# Patient Record
Sex: MALE | Race: WHITE | Employment: PART TIME | URBAN - METROPOLITAN AREA
[De-identification: names, ages, dates, MRNs, and addresses within clinical notes are randomized per-mention and may not be internally consistent; named-entity substitution may affect disease eponyms.]

---

## 2019-07-15 ENCOUNTER — OFFICE VISIT (OUTPATIENT)
Dept: URGENT CARE | Facility: RETAIL CLINIC | Age: 29
End: 2019-07-15

## 2019-07-15 VITALS
OXYGEN SATURATION: 96 % | DIASTOLIC BLOOD PRESSURE: 64 MMHG | TEMPERATURE: 98.4 F | SYSTOLIC BLOOD PRESSURE: 106 MMHG | HEART RATE: 79 BPM

## 2019-07-15 DIAGNOSIS — L08.9 LOCAL INFECTION OF SKIN AND SUBCUTANEOUS TISSUE: Primary | ICD-10-CM
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
## 2019-07-15 PROCEDURE — 99203 OFFICE O/P NEW LOW 30 MIN: CPT | Performed by: NURSE PRACTITIONER

## 2019-07-15 RX ORDER — DOXYCYCLINE 100 MG/1
100 TABLET ORAL 2 TIMES DAILY
Qty: 20 TABLET | Refills: 0 | Status: SHIPPED | OUTPATIENT
Start: 2019-07-15

## 2019-07-15 ASSESSMENT — ENCOUNTER SYMPTOMS
SINUS PRESSURE: 0
FEVER: 0
VOMITING: 0
CHEST TIGHTNESS: 0
SORE THROAT: 0
WHEEZING: 0
CHILLS: 0
RHINORRHEA: 0
NAUSEA: 0
DIARRHEA: 0
COUGH: 0

## 2019-07-15 NOTE — PATIENT INSTRUCTIONS
"  Patient Education     Tick Bites  Ticks are small arachnids that feed on the blood of rodents, rabbits, birds, deer, dogs, and people. A tick bite may cause a reaction like a spider bite. You may have redness, itching, and slight swelling at the site. Sometimes you may have no reaction where the tick bit you.  Ticks may gorge themselves for days before you find and remove them. The bites themselves aren't cause for concern. But ticks can carry and pass on illnesses such as Lyme disease and [place] spotted fever. Both diseases begin with a rash and symptoms similar to the flu. In advanced stages, these diseases can be quite serious.     A \"bull's eye\" rash is a common symptom of Lyme disease.   When to go to the emergency room (ER)  Not all ticks carry disease. And a tick must stay attached for at least 24 hours to infect you. If you find a tick, don't panic. Try to carefully remove it with tweezers. Grasp the tick near its head and pull without twisting. If you can't easily dislodge the tick or if you leave the head in your skin, get medical care right away.  What to expect in the ER    The tick or any parts of the tick will be removed and the bite will be cleaned.    To prevent disease, you may be given antibiotics. Both Lyme disease and [place] spotted fever respond quickly to these medicines.    You may be asked to see your healthcare provider for a blood test to check for Lyme disease.  Follow-up care  Some states and counties have services that test ticks for Lyme disease and other diseases. Check with your local officials to see if this service is available in your area.  If you remove a tick yourself, watch for signs of a tick-borne illness. Symptoms may show up within a few days or weeks after a bite. Call your healthcare provider if you notice any of the following:    Rash. The rash may spread outward in a ring from a hard white lump. Or it may move up your arms and legs to your " chest.    Chills and fever    Body aches and joint pain    Severe headache  Date Last Reviewed: 12/1/2016 2000-2018 [employer]. [address] [place] [address], [place] [address]. All rights reserved. This information is not intended as a substitute for professional medical care. Always follow your healthcare professional's instructions.           Patient Education     Wound Check, Infection  You have a wound that has become infected. The wound will not heal properly unless the infection is cleared. Infection in a wound may also spread if it is not treated. In most cases, antibiotic medicines are prescribed to treat a wound infection.   Symptoms of a wound infection include:    Redness or swelling around the wound    Warmth coming from the wound    New or worsening pain    Red streaks around the wound    Draining pus    Fever  Home care  Follow all directions you are given to treat the infection.  Medicines  Take all medicines as prescribed.     If you were given antibiotics, take them until they are gone or your healthcare provider tells you to stop. It is vital to finish the antibiotics even if you feel better. If you don't finish them, the infection may come back and be harder to treat.    If your infection is not responding to the medicines you are taking, you may be prescribed new medicines.    Take medicine for pain as directed by your healthcare provider.  Wound care  Care for your wound as directed by your healthcare provider.    Apply a warm compress (clean cloth soaked in hot water) to the infected area for about 5 to 10 minutes at a time. Be very careful not to burn yourself. Test the cloth on a non-infected area to make sure it is not too hot.    Continue to change the dressing daily. If it becomes wet, stained with wound fluid, or dirty, change it sooner. To change it:  ? Wash your hands with soap and water before changing the dressing.  ? Carefully remove the dressing and tape. If it sticks to  the wound, you may need to wet it a little to remove it. (Don't do this if your healthcare provider has told you not to.)  ? Gently clean the wound with clean water (or saline) using gauze, a clean washcloth, or cotton swab.  ? Don't use soap, alcohol, peroxide or other cleansers.  ? If you were told to dry the wound before putting on a new dressing, gently pat. Don't rub.  ? Throw out the old dressing.  ? Wash your hands again before opening the new, clean dressing.  ? Wash your hands again when you are done.  Follow-up care  Follow up with your healthcare provider as advised. If a culture was done, you will be notified if your treatment needs to change. Call as directed for the results.  When to seek medical advice  Call your healthcare provider right away if any of these occur:    Symptoms of infection don't start to improve within 2 days of starting antibiotics    Symptoms of infection get worse    New symptoms, such as red streaks around the wound    Fever of 100.4 F (38.0 C) or higher for more than 2 days after starting the antibiotics, or as advised by your healthcare provider  Date Last Reviewed: 3/1/2018    9448-0304 [employer]. [address] [place] [address], [place] [address]. All rights reserved. This information is not intended as a substitute for professional medical care. Always follow your healthcare professional's instructions.  If increase redness, swelling drainage or pain to see primary care clinic in 24 to 48 hours

## 2019-07-15 NOTE — PROGRESS NOTES
SUBJECTIVE:   Jose Vizcarra is a 29 year old male presenting with a chief complaint of   Chief Complaint   Patient presents with     Insect Bites     deer tick bite to right pinky toe      Patient lives in [place] and [place] the woods.  Patient is here to visit parents and has residency    He is a new patient of [facility].    Patient is flying to [place] in 2 days and will be there for 3 months.  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  Severity moderately severe  Current and Associated symptoms: itching, painful and red   Location of the rash: right 5th toe   Previous history of a similar rash? None  Recent exposure history tick   Denies exposure to: Denied  Associated symptoms include: nothing.  Treatment measures tried include: none  Patient states he did not injury this 5th digit     Review of Systems   Constitutional: Negative for chills and fever.   HENT: Negative for rhinorrhea, sinus pressure and sore throat.    Respiratory: Negative for cough, chest tightness and wheezing.    Cardiovascular: Negative for chest pain.   Gastrointestinal: Negative for diarrhea, nausea and vomiting.       Past Medical History:   Diagnosis Date     Mumps 2014     Whooping cough 2014     No family history on file.  Current Outpatient Medications   Medication Sig Dispense Refill     acetaminophen (TYLENOL) 500 MG tablet Take 2 tablets (1,000 mg) by mouth every 6 hours as needed for pain (Patient not taking: Reported on 7/15/2019)       HYDROcodone-acetaminophen (NORCO) 5-325 MG per tablet Take 1-2 tablets by mouth every 4 hours as needed for moderate to severe pain 20 tablet 0     Social History     Tobacco Use     Smoking status: Light Tobacco Smoker     Types: Pipe     Smokeless tobacco: Never Used   Substance Use Topics     Alcohol use: Yes       OBJECTIVE  /64   Pulse 79   Temp 98.4  F (36.9  C) (Oral)   SpO2 96%  "    Physical Exam   Constitutional: Vital signs are normal. He appears well-developed and well-nourished.   HENT:   Head: Normocephalic and atraumatic.   Eyes: Conjunctivae are normal.   Cardiovascular: Normal rate and regular rhythm.   Pulmonary/Chest: Effort normal and breath sounds normal.   Neurological: He is alert.   Skin:   Right foot between the 4th digit and 5 digit raised hard area 1 mm dark area removed with blade very small amount of blood. No dark area afterwards.    5th digit is redness swollen and tender   Nursing note and vitals reviewed.    ASSESSMENT:      ICD-10-CM    1. Local infection of skin and subcutaneous tissue L08.9 doxycycline monohydrate (ADOXA) 100 MG tablet      PLAN:  Doxycycline 100 mg one tablet twice a day for 10 days amount 20   Soak foot 3 to 4 times a day in antimicrobial soap    Patient Instructions       Patient Education     Tick Bites  Ticks are small arachnids that feed on the blood of rodents, rabbits, birds, deer, dogs, and people. A tick bite may cause a reaction like a spider bite. You may have redness, itching, and slight swelling at the site. Sometimes you may have no reaction where the tick bit you.  Ticks may gorge themselves for days before you find and remove them. The bites themselves aren't cause for concern. But ticks can carry and pass on illnesses such as Lyme disease and [place] spotted fever. Both diseases begin with a rash and symptoms similar to the flu. In advanced stages, these diseases can be quite serious.     A \"bull's eye\" rash is a common symptom of Lyme disease.   When to go to the emergency room (ER)  Not all ticks carry disease. And a tick must stay attached for at least 24 hours to infect you. If you find a tick, don't panic. Try to carefully remove it with tweezers. Grasp the tick near its head and pull without twisting. If you can't easily dislodge the tick or if you leave the head in your skin, get medical care right away.  What to " expect in the ER    The tick or any parts of the tick will be removed and the bite will be cleaned.    To prevent disease, you may be given antibiotics. Both Lyme disease and [place] spotted fever respond quickly to these medicines.    You may be asked to see your healthcare provider for a blood test to check for Lyme disease.  Follow-up care  Some states and [facility] have services that test ticks for Lyme disease and other diseases. Check with your local officials to see if this service is available in your area.  If you remove a tick yourself, watch for signs of a tick-borne illness. Symptoms may show up within a few days or weeks after a bite. Call your healthcare provider if you notice any of the following:    Rash. The rash may spread outward in a ring from a hard white lump. Or it may move up your arms and legs to your chest.    Chills and fever    Body aches and joint pain    Severe headache  Date Last Reviewed: 12/1/2016 2000-2018 [employer]. [address] [place] [address]. All rights reserved. This information is not intended as a substitute for professional medical care. Always follow your healthcare professional's instructions.           Patient Education     Wound Check, Infection  You have a wound that has become infected. The wound will not heal properly unless the infection is cleared. Infection in a wound may also spread if it is not treated. In most cases, antibiotic medicines are prescribed to treat a wound infection.   Symptoms of a wound infection include:    Redness or swelling around the wound    Warmth coming from the wound    New or worsening pain    Red streaks around the wound    Draining pus    Fever  Home care  Follow all directions you are given to treat the infection.  Medicines  Take all medicines as prescribed.     If you were given antibiotics, take them until they are gone or your healthcare provider tells you to stop. It is vital to finish the  antibiotics even if you feel better. If you don't finish them, the infection may come back and be harder to treat.    If your infection is not responding to the medicines you are taking, you may be prescribed new medicines.    Take medicine for pain as directed by your healthcare provider.  Wound care  Care for your wound as directed by your healthcare provider.    Apply a warm compress (clean cloth soaked in hot water) to the infected area for about 5 to 10 minutes at a time. Be very careful not to burn yourself. Test the cloth on a non-infected area to make sure it is not too hot.    Continue to change the dressing daily. If it becomes wet, stained with wound fluid, or dirty, change it sooner. To change it:  ? Wash your hands with soap and water before changing the dressing.  ? Carefully remove the dressing and tape. If it sticks to the wound, you may need to wet it a little to remove it. (Don't do this if your healthcare provider has told you not to.)  ? Gently clean the wound with clean water (or saline) using gauze, a clean washcloth, or cotton swab.  ? Don't use soap, alcohol, peroxide or other cleansers.  ? If you were told to dry the wound before putting on a new dressing, gently pat. Don't rub.  ? Throw out the old dressing.  ? Wash your hands again before opening the new, clean dressing.  ? Wash your hands again when you are done.  Follow-up care  Follow up with your healthcare provider as advised. If a culture was done, you will be notified if your treatment needs to change. Call as directed for the results.  When to seek medical advice  Call your healthcare provider right away if any of these occur:    Symptoms of infection don't start to improve within 2 days of starting antibiotics    Symptoms of infection get worse    New symptoms, such as red streaks around the wound    Fever of 100.4 F (38.0 C) or higher for more than 2 days after starting the antibiotics, or as advised by your healthcare  provider  Date Last Reviewed: 3/1/2018    [id number] [employer]. [address] [facility], [place] [address]. All rights reserved. This information is not intended as a substitute for professional medical care. Always follow your healthcare professional's instructions.  If increase redness, swelling drainage or pain to see primary care clinic in 24 to 48 hours

## 2020-03-02 ENCOUNTER — HEALTH MAINTENANCE LETTER (OUTPATIENT)
Age: 30
End: 2020-03-02

## 2020-12-20 ENCOUNTER — HEALTH MAINTENANCE LETTER (OUTPATIENT)
Age: 30
End: 2020-12-20

## 2021-04-18 ENCOUNTER — HEALTH MAINTENANCE LETTER (OUTPATIENT)
Age: 31
End: 2021-04-18

## 2021-10-03 ENCOUNTER — HEALTH MAINTENANCE LETTER (OUTPATIENT)
Age: 31
End: 2021-10-03

## 2022-05-15 ENCOUNTER — HEALTH MAINTENANCE LETTER (OUTPATIENT)
Age: 32
End: 2022-05-15

## 2022-09-10 ENCOUNTER — HEALTH MAINTENANCE LETTER (OUTPATIENT)
Age: 32
End: 2022-09-10

## 2023-06-03 ENCOUNTER — HEALTH MAINTENANCE LETTER (OUTPATIENT)
Age: 33
End: 2023-06-03